# Patient Record
Sex: MALE | Race: WHITE | Employment: FULL TIME | URBAN - METROPOLITAN AREA
[De-identification: names, ages, dates, MRNs, and addresses within clinical notes are randomized per-mention and may not be internally consistent; named-entity substitution may affect disease eponyms.]

---

## 2018-07-05 ENCOUNTER — HOSPITAL ENCOUNTER (EMERGENCY)
Age: 23
Discharge: HOME | End: 2018-07-06
Attending: EMERGENCY MEDICINE
Payer: COMMERCIAL

## 2018-07-05 DIAGNOSIS — K52.9 ENTERITIS: Primary | ICD-10-CM

## 2018-07-05 DIAGNOSIS — R18.8 FREE FLUID IN PELVIS: ICD-10-CM

## 2018-07-05 PROCEDURE — 99284 EMERGENCY DEPT VISIT MOD MDM: CPT

## 2018-07-05 RX ORDER — KETOROLAC TROMETHAMINE 30 MG/ML
30 INJECTION, SOLUTION INTRAMUSCULAR; INTRAVENOUS ONCE
Status: COMPLETED | OUTPATIENT
Start: 2018-07-05 | End: 2018-07-06

## 2018-07-05 RX ORDER — ONDANSETRON 2 MG/ML
4 INJECTION INTRAMUSCULAR; INTRAVENOUS ONCE
Status: COMPLETED | OUTPATIENT
Start: 2018-07-05 | End: 2018-07-06

## 2018-07-05 RX ORDER — 0.9 % SODIUM CHLORIDE 0.9 %
1000 INTRAVENOUS SOLUTION INTRAVENOUS ONCE
Status: COMPLETED | OUTPATIENT
Start: 2018-07-05 | End: 2018-07-06

## 2018-07-06 ENCOUNTER — APPOINTMENT (OUTPATIENT)
Dept: CT IMAGING | Age: 23
End: 2018-07-06
Payer: COMMERCIAL

## 2018-07-06 VITALS
TEMPERATURE: 98.6 F | DIASTOLIC BLOOD PRESSURE: 76 MMHG | SYSTOLIC BLOOD PRESSURE: 144 MMHG | WEIGHT: 230 LBS | RESPIRATION RATE: 20 BRPM | BODY MASS INDEX: 28.6 KG/M2 | HEART RATE: 78 BPM | OXYGEN SATURATION: 98 % | HEIGHT: 75 IN

## 2018-07-06 LAB
ALBUMIN SERPL-MCNC: 4.9 G/DL (ref 3.5–5.2)
ALP BLD-CCNC: 47 U/L (ref 40–129)
ALT SERPL-CCNC: 17 U/L (ref 0–40)
ANION GAP SERPL CALCULATED.3IONS-SCNC: 19 MMOL/L (ref 7–16)
AST SERPL-CCNC: 19 U/L (ref 0–39)
BASOPHILS ABSOLUTE: 0.02 E9/L (ref 0–0.2)
BASOPHILS RELATIVE PERCENT: 0.2 % (ref 0–2)
BILIRUB SERPL-MCNC: 0.4 MG/DL (ref 0–1.2)
BILIRUBIN URINE: NEGATIVE
BLOOD, URINE: NEGATIVE
BUN BLDV-MCNC: 17 MG/DL (ref 6–20)
CALCIUM SERPL-MCNC: 10 MG/DL (ref 8.6–10.2)
CHLORIDE BLD-SCNC: 98 MMOL/L (ref 98–107)
CLARITY: CLEAR
CO2: 23 MMOL/L (ref 22–29)
COLOR: YELLOW
CREAT SERPL-MCNC: 0.8 MG/DL (ref 0.7–1.2)
EOSINOPHILS ABSOLUTE: 0 E9/L (ref 0.05–0.5)
EOSINOPHILS RELATIVE PERCENT: 0 % (ref 0–6)
GFR AFRICAN AMERICAN: >60
GFR NON-AFRICAN AMERICAN: >60 ML/MIN/1.73
GLUCOSE BLD-MCNC: 131 MG/DL (ref 74–109)
GLUCOSE URINE: NEGATIVE MG/DL
HCT VFR BLD CALC: 44.8 % (ref 37–54)
HEMOGLOBIN: 15.2 G/DL (ref 12.5–16.5)
IMMATURE GRANULOCYTES #: 0.04 E9/L
IMMATURE GRANULOCYTES %: 0.3 % (ref 0–5)
KETONES, URINE: ABNORMAL MG/DL
LACTIC ACID: 1.7 MMOL/L (ref 0.5–2.2)
LEUKOCYTE ESTERASE, URINE: NEGATIVE
LIPASE: 28 U/L (ref 13–60)
LYMPHOCYTES ABSOLUTE: 1.04 E9/L (ref 1.5–4)
LYMPHOCYTES RELATIVE PERCENT: 7.8 % (ref 20–42)
MCH RBC QN AUTO: 30 PG (ref 26–35)
MCHC RBC AUTO-ENTMCNC: 33.9 % (ref 32–34.5)
MCV RBC AUTO: 88.5 FL (ref 80–99.9)
MONOCYTES ABSOLUTE: 0.54 E9/L (ref 0.1–0.95)
MONOCYTES RELATIVE PERCENT: 4.1 % (ref 2–12)
NEUTROPHILS ABSOLUTE: 11.63 E9/L (ref 1.8–7.3)
NEUTROPHILS RELATIVE PERCENT: 87.6 % (ref 43–80)
NITRITE, URINE: NEGATIVE
PDW BLD-RTO: 12.3 FL (ref 11.5–15)
PH UA: 7 (ref 5–9)
PLATELET # BLD: 239 E9/L (ref 130–450)
PMV BLD AUTO: 10.9 FL (ref 7–12)
POTASSIUM SERPL-SCNC: 4 MMOL/L (ref 3.5–5)
PROTEIN UA: NEGATIVE MG/DL
RBC # BLD: 5.06 E12/L (ref 3.8–5.8)
SODIUM BLD-SCNC: 140 MMOL/L (ref 132–146)
SPECIFIC GRAVITY UA: 1.01 (ref 1–1.03)
TOTAL PROTEIN: 8.4 G/DL (ref 6.4–8.3)
UROBILINOGEN, URINE: 0.2 E.U./DL
WBC # BLD: 13.3 E9/L (ref 4.5–11.5)

## 2018-07-06 PROCEDURE — 81003 URINALYSIS AUTO W/O SCOPE: CPT

## 2018-07-06 PROCEDURE — 6360000004 HC RX CONTRAST MEDICATION: Performed by: RADIOLOGY

## 2018-07-06 PROCEDURE — 80053 COMPREHEN METABOLIC PANEL: CPT

## 2018-07-06 PROCEDURE — 83605 ASSAY OF LACTIC ACID: CPT

## 2018-07-06 PROCEDURE — 6360000002 HC RX W HCPCS: Performed by: PHYSICIAN ASSISTANT

## 2018-07-06 PROCEDURE — 85025 COMPLETE CBC W/AUTO DIFF WBC: CPT

## 2018-07-06 PROCEDURE — 96375 TX/PRO/DX INJ NEW DRUG ADDON: CPT

## 2018-07-06 PROCEDURE — 96374 THER/PROPH/DIAG INJ IV PUSH: CPT

## 2018-07-06 PROCEDURE — 83690 ASSAY OF LIPASE: CPT

## 2018-07-06 PROCEDURE — 74177 CT ABD & PELVIS W/CONTRAST: CPT

## 2018-07-06 PROCEDURE — 2580000003 HC RX 258: Performed by: PHYSICIAN ASSISTANT

## 2018-07-06 PROCEDURE — 36415 COLL VENOUS BLD VENIPUNCTURE: CPT

## 2018-07-06 RX ORDER — METRONIDAZOLE 500 MG/1
500 TABLET ORAL 2 TIMES DAILY
Qty: 20 TABLET | Refills: 0 | Status: SHIPPED | OUTPATIENT
Start: 2018-07-06 | End: 2018-07-16

## 2018-07-06 RX ORDER — PROMETHAZINE HYDROCHLORIDE 25 MG/ML
12.5 INJECTION, SOLUTION INTRAMUSCULAR; INTRAVENOUS ONCE
Status: COMPLETED | OUTPATIENT
Start: 2018-07-06 | End: 2018-07-06

## 2018-07-06 RX ORDER — FENTANYL CITRATE 50 UG/ML
25 INJECTION, SOLUTION INTRAMUSCULAR; INTRAVENOUS ONCE
Status: COMPLETED | OUTPATIENT
Start: 2018-07-06 | End: 2018-07-06

## 2018-07-06 RX ORDER — CIPROFLOXACIN 500 MG/1
500 TABLET, FILM COATED ORAL 2 TIMES DAILY
Qty: 20 TABLET | Refills: 0 | Status: SHIPPED | OUTPATIENT
Start: 2018-07-06 | End: 2018-07-16

## 2018-07-06 RX ADMIN — KETOROLAC TROMETHAMINE 30 MG: 30 INJECTION, SOLUTION INTRAMUSCULAR at 00:20

## 2018-07-06 RX ADMIN — IOPAMIDOL 80 ML: 755 INJECTION, SOLUTION INTRAVENOUS at 02:35

## 2018-07-06 RX ADMIN — PROMETHAZINE HYDROCHLORIDE 12.5 MG: 25 INJECTION INTRAMUSCULAR; INTRAVENOUS at 02:13

## 2018-07-06 RX ADMIN — FENTANYL CITRATE 25 MCG: 50 INJECTION INTRAMUSCULAR; INTRAVENOUS at 02:14

## 2018-07-06 RX ADMIN — ONDANSETRON 4 MG: 2 INJECTION INTRAMUSCULAR; INTRAVENOUS at 00:20

## 2018-07-06 RX ADMIN — SODIUM CHLORIDE 1000 ML: 9 INJECTION, SOLUTION INTRAVENOUS at 00:20

## 2018-07-06 RX ADMIN — IOHEXOL 50 ML: 240 INJECTION, SOLUTION INTRATHECAL; INTRAVASCULAR; INTRAVENOUS; ORAL at 02:35

## 2018-07-06 ASSESSMENT — PAIN SCALES - GENERAL
PAINLEVEL_OUTOF10: 10
PAINLEVEL_OUTOF10: 8
PAINLEVEL_OUTOF10: 3
PAINLEVEL_OUTOF10: 8

## 2018-07-06 ASSESSMENT — PAIN DESCRIPTION - LOCATION: LOCATION: ABDOMEN

## 2018-07-06 NOTE — ED PROVIDER NOTES
ED Attending  CC: No  HPI:  7/5/18, Time: 11:38 PM         Naheed Bashir is a 20 y.o. male presenting to the ED for abdominal pain , beginning 6 hours  ago. The complaint has been persistent, moderate in severity, and worsened by nothing. Patient comes in with complaint of periumbilical pain. He states it started around 5 PM progressively worse. Denies any pain radiation to the back. He states the pains are constant crampy sharp pain. His nausea with vomiting no hematocrit emesis. Denies any diarrhea constipation denies any urinary frequency urgency burning. No fever. Review of Systems:   Pertinent positives and negatives are stated within HPI, all other systems reviewed and are negative.          --------------------------------------------- PAST HISTORY ---------------------------------------------  Past Medical History:  has a past medical history of Anxiety. Past Surgical History:  has no past surgical history on file. Social History:  reports that he has never smoked. He does not have any smokeless tobacco history on file. He reports that he does not drink alcohol or use drugs. Family History: family history is not on file. The patients home medications have been reviewed. Allergies: Patient has no known allergies.     -------------------------------------------------- RESULTS -------------------------------------------------  All laboratory and radiology results have been personally reviewed by myself   LABS:  Results for orders placed or performed during the hospital encounter of 07/05/18   CBC Auto Differential   Result Value Ref Range    WBC 13.3 (H) 4.5 - 11.5 E9/L    RBC 5.06 3.80 - 5.80 E12/L    Hemoglobin 15.2 12.5 - 16.5 g/dL    Hematocrit 44.8 37.0 - 54.0 %    MCV 88.5 80.0 - 99.9 fL    MCH 30.0 26.0 - 35.0 pg    MCHC 33.9 32.0 - 34.5 %    RDW 12.3 11.5 - 15.0 fL    Platelets 282 908 - 942 E9/L    MPV 10.9 7.0 - 12.0 fL    Neutrophils % 87.6 (H) 43.0 - 80.0 %    Immature Granulocytes % 0.3 0.0 - 5.0 %    Lymphocytes % 7.8 (L) 20.0 - 42.0 %    Monocytes % 4.1 2.0 - 12.0 %    Eosinophils % 0.0 0.0 - 6.0 %    Basophils % 0.2 0.0 - 2.0 %    Neutrophils # 11.63 (H) 1.80 - 7.30 E9/L    Immature Granulocytes # 0.04 E9/L    Lymphocytes # 1.04 (L) 1.50 - 4.00 E9/L    Monocytes # 0.54 0.10 - 0.95 E9/L    Eosinophils # 0.00 (L) 0.05 - 0.50 E9/L    Basophils # 0.02 0.00 - 0.20 E9/L   Comprehensive Metabolic Panel   Result Value Ref Range    Sodium 140 132 - 146 mmol/L    Potassium 4.0 3.5 - 5.0 mmol/L    Chloride 98 98 - 107 mmol/L    CO2 23 22 - 29 mmol/L    Anion Gap 19 (H) 7 - 16 mmol/L    Glucose 131 (H) 74 - 109 mg/dL    BUN 17 6 - 20 mg/dL    CREATININE 0.8 0.7 - 1.2 mg/dL    GFR Non-African American >60 >=60 mL/min/1.73    GFR African American >60     Calcium 10.0 8.6 - 10.2 mg/dL    Total Protein 8.4 (H) 6.4 - 8.3 g/dL    Alb 4.9 3.5 - 5.2 g/dL    Total Bilirubin 0.4 0.0 - 1.2 mg/dL    Alkaline Phosphatase 47 40 - 129 U/L    ALT 17 0 - 40 U/L    AST 19 0 - 39 U/L   Lactic Acid, Plasma   Result Value Ref Range    Lactic Acid 1.7 0.5 - 2.2 mmol/L   Lipase   Result Value Ref Range    Lipase 28 13 - 60 U/L   Urinalysis   Result Value Ref Range    Color, UA Yellow Straw/Yellow    Clarity, UA Clear Clear    Glucose, Ur Negative Negative mg/dL    Bilirubin Urine Negative Negative    Ketones, Urine TRACE (A) Negative mg/dL    Specific Gravity, UA 1.010 1.005 - 1.030    Blood, Urine Negative Negative    pH, UA 7.0 5.0 - 9.0    Protein, UA Negative Negative mg/dL    Urobilinogen, Urine 0.2 <2.0 E.U./dL    Nitrite, Urine Negative Negative    Leukocyte Esterase, Urine Negative Negative       RADIOLOGY:  Interpreted by Radiologist.  CT ABDOMEN PELVIS W IV CONTRAST Additional Contrast? Oral    (Results Pending)       ------------------------- NURSING NOTES AND VITALS REVIEWED ---------------------------   The nursing notes within the ED encounter and vital signs as below have been reviewed.    BP (!) 144/76   Pulse 78   Temp 98.6 °F (37 °C)   Resp 20   Ht 6' 3\" (1.905 m)   Wt 230 lb (104.3 kg)   SpO2 98%   BMI 28.75 kg/m²   Oxygen Saturation Interpretation: Normal      ---------------------------------------------------PHYSICAL EXAM--------------------------------------      Constitutional/General: Alert and oriented x3,   Head: Normocephalic and atraumatic  Eyes: PERRL, EOMI  Mouth: Oropharynx clear, handling secretions, no trismus  Neck: Supple, full ROM,   Pulmonary: Lungs clear to auscultation bilaterally, no wheezes, rales, or rhonchi. Not in respiratory distress  Cardiovascular:  Regular rate and rhythm, no murmurs, gallops, or rubs. 2+ distal pulses  Abdomen: Soft, tender right lower quadrant suprapubic left lower quadrant non distended,   Extremities: Moves all extremities x 4. Warm and well perfused  Skin: warm and dry without rash  Neurologic: GCS 15,  Psych: Normal Affect      ------------------------------ ED COURSE/MEDICAL DECISION MAKING----------------------  Medications   0.9 % sodium chloride bolus (0 mLs Intravenous Stopped 7/6/18 0208)   ketorolac (TORADOL) injection 30 mg (30 mg Intravenous Given 7/6/18 0020)   ondansetron (ZOFRAN) injection 4 mg (4 mg Intravenous Given 7/6/18 0020)   promethazine (PHENERGAN) injection 12.5 mg (12.5 mg Intramuscular Given 7/6/18 0213)   fentaNYL (SUBLIMAZE) injection 25 mcg (25 mcg Intravenous Given 7/6/18 0214)   iopamidol (ISOVUE-370) 76 % injection 80 mL (80 mLs Intravenous Given 7/6/18 0235)   iohexol (OMNIPAQUE 240) injection 50 mL (50 mLs Oral Given 7/6/18 0235)         ED COURSE:   4618 Patient states after taking one sip of his contrast he had another episode of emesis. He states the nausea is slightly improved but persists. Continues with abdominal pain. 1:19 AM  Patient states pain nausea improved    0220 Patient states pain nausea controlled. He is going over for CT abdomen pelvis at this time.     0225 Care transferred to Dr. Keila Valdivia at

## 2024-03-04 DIAGNOSIS — M25.561 RIGHT KNEE PAIN, UNSPECIFIED CHRONICITY: Primary | ICD-10-CM

## 2024-03-11 ENCOUNTER — OFFICE VISIT (OUTPATIENT)
Dept: ORTHOPEDIC SURGERY | Age: 29
End: 2024-03-11
Payer: COMMERCIAL

## 2024-03-11 VITALS — WEIGHT: 250 LBS | TEMPERATURE: 98 F | HEIGHT: 75 IN | BODY MASS INDEX: 31.08 KG/M2

## 2024-03-11 DIAGNOSIS — S83.511A RUPTURE OF ANTERIOR CRUCIATE LIGAMENT OF RIGHT KNEE, INITIAL ENCOUNTER: ICD-10-CM

## 2024-03-11 DIAGNOSIS — S83.241A ACUTE MEDIAL MENISCUS TEAR, RIGHT, INITIAL ENCOUNTER: Primary | ICD-10-CM

## 2024-03-11 PROCEDURE — 99203 OFFICE O/P NEW LOW 30 MIN: CPT | Performed by: ORTHOPAEDIC SURGERY

## 2024-03-11 NOTE — PROGRESS NOTES
patient    Assessment:  Encounter Diagnoses   Name Primary?    Acute medial meniscus tear, right, initial encounter Yes    Rupture of anterior cruciate ligament of right knee, initial encounter        Plan:  Natural history and expected course discussed. Questions answered.  Educational materials distributed.  Rest, ice, compression, and elevation (RICE) therapy.  Reduction in offending activity.  NSAIDs per medication orders.    MRI right knee

## 2024-04-09 ENCOUNTER — OFFICE VISIT (OUTPATIENT)
Dept: ORTHOPEDIC SURGERY | Age: 29
End: 2024-04-09
Payer: COMMERCIAL

## 2024-04-09 VITALS — TEMPERATURE: 98 F | HEIGHT: 75 IN | WEIGHT: 250 LBS | BODY MASS INDEX: 31.08 KG/M2

## 2024-04-09 DIAGNOSIS — M17.12 PRIMARY OSTEOARTHRITIS OF LEFT KNEE: Primary | ICD-10-CM

## 2024-04-09 PROCEDURE — 99213 OFFICE O/P EST LOW 20 MIN: CPT | Performed by: ORTHOPAEDIC SURGERY

## 2024-04-09 NOTE — PROGRESS NOTES
cyanosis or clubbing.    Lumbar exam:  On visual inspection, there is not deformity of the spine.   full range of motion, no tenderness, palpable spasm or pain on motion. Special tests: Straight Leg Raise negative, [place] test negative.      Hip exam:   Upon inspection, there is not deformity noted.  Upon palpation there is not tenderness.  ROM: is  full and symmetrical.   Strength: Hip Flexors 5/5; Hip Abductors 5/5; Hip Adduction 5/5.     Knee exam:  Right knee exam shows;  range of motion of R. Knee is 0 to 125, and L. Knee is 0 to 125. The patient does have  pain on motion, effusion is mild, there is tenderness over the  medial region, there are not any masses, there is not ligamentous instability, there is not  deformity noted.    Knee exam: neither positive for moderate crepitations, some mild tenderness laxity is not noted with stress.  There is not a popliteal cyst.    R. Knee:  Lachman's positive, Anterior Drawer positive, Posterior Drawer negative  Johny's positive, Thallasy  positive,   PF grind test negative, Apprehension test negative, Patellar J sign  negative  L. Knee:  Lachman's negative, Anterior Drawer negative, Posterior Drawer negative  Johny's negative, Thallasy  negative,   PF grind test negative, Apprehension test negative,  Patellar J sign  negative    Xray Exam:  No acute abnormality      MRI:  1. Mild motion artifact.  2. Small effusion.  3. Intact menisci, cruciate ligaments and collateral ligaments allowing for  the motion artifact.  4. Sizable focus of high-grade cartilage loss in the lateral femoral condyle.  Radiographic findings reviewed with patient    Assessment:  Encounter Diagnoses   Name Primary?    Primary osteoarthritis of left knee Yes         Plan:  Natural history and expected course discussed. Questions answered.  Educational materials distributed.  Rest, ice, compression, and elevation (RICE) therapy.  Reduction in offending activity.  NSAIDs per medication